# Patient Record
Sex: FEMALE | Race: OTHER | NOT HISPANIC OR LATINO | Employment: FULL TIME | URBAN - METROPOLITAN AREA
[De-identification: names, ages, dates, MRNs, and addresses within clinical notes are randomized per-mention and may not be internally consistent; named-entity substitution may affect disease eponyms.]

---

## 2022-01-11 RX ORDER — AMLODIPINE BESYLATE 5 MG/1
5 TABLET ORAL DAILY
COMMUNITY

## 2022-01-12 ENCOUNTER — OFFICE VISIT (OUTPATIENT)
Dept: FAMILY MEDICINE CLINIC | Facility: CLINIC | Age: 57
End: 2022-01-12

## 2022-01-12 VITALS
DIASTOLIC BLOOD PRESSURE: 70 MMHG | OXYGEN SATURATION: 99 % | SYSTOLIC BLOOD PRESSURE: 124 MMHG | HEART RATE: 77 BPM | HEIGHT: 67 IN | BODY MASS INDEX: 22.6 KG/M2 | WEIGHT: 144 LBS | RESPIRATION RATE: 16 BRPM

## 2022-01-12 DIAGNOSIS — Z12.4 CERVICAL CANCER SCREENING: ICD-10-CM

## 2022-01-12 DIAGNOSIS — Z12.31 ENCOUNTER FOR SCREENING MAMMOGRAM FOR MALIGNANT NEOPLASM OF BREAST: ICD-10-CM

## 2022-01-12 DIAGNOSIS — Z00.00 ANNUAL PHYSICAL EXAM: ICD-10-CM

## 2022-01-12 DIAGNOSIS — R53.1 WEAK: ICD-10-CM

## 2022-01-12 DIAGNOSIS — Z12.11 COLON CANCER SCREENING: ICD-10-CM

## 2022-01-12 DIAGNOSIS — Z76.89 ENCOUNTER TO ESTABLISH CARE: Primary | ICD-10-CM

## 2022-01-12 DIAGNOSIS — I10 ESSENTIAL HYPERTENSION: ICD-10-CM

## 2022-01-12 DIAGNOSIS — F43.21 GRIEF: ICD-10-CM

## 2022-01-12 DIAGNOSIS — Z83.49 FAMILY HISTORY OF THYROID DISEASE: ICD-10-CM

## 2022-01-12 DIAGNOSIS — K76.0 FATTY LIVER: ICD-10-CM

## 2022-01-12 DIAGNOSIS — Z13.220 LIPID SCREENING: ICD-10-CM

## 2022-01-12 PROBLEM — R19.8 GENERALIZED ABDOMINAL FULLNESS: Status: ACTIVE | Noted: 2021-12-09

## 2022-01-12 PROBLEM — R25.2 MUSCLE CRAMPS: Status: ACTIVE | Noted: 2021-12-09

## 2022-01-12 PROCEDURE — 99202 OFFICE O/P NEW SF 15 MIN: CPT | Performed by: FAMILY MEDICINE

## 2022-01-12 RX ORDER — MULTIVITAMIN
1 CAPSULE ORAL DAILY
COMMUNITY

## 2022-01-12 NOTE — PROGRESS NOTES
Assessment/Plan:   Diagnoses and all orders for this visit:    Encounter to establish care  Annual physical exam  - reviewed PMHx, PSHx, meds, allergies, FHx, Soc Hx and Sexual Hx   - UTD with COVID IMMs (Covid Shield - received in [facility]) - advised to schedule Booster   - declined Tdap/Flu vaccine in the office as w/o insurance   - discussed diet and exercise  - overdue for screening labs - script given   - declined STD screening in the office today   - overdue for annual GYN exam/cervical ca and breast ca screening - referral given for Ob/Gyn and script given for Mammo  - overdue for Colon Ca screening - referral given for GI  - overdue for Optho and Dental   - RTO in 1month with screening labs for f/u - pt and Daughter aware and agreeable     Essential hypertension  -     Comprehensive metabolic panel; Future  -     Microalbumin / creatinine urine ratio  - BP in the office 124/70 on my repeat  - of note, has not taken her CCB in >1month   - discussed diet and encouraged exercise  - limit Na to 2g/day   - caution with NSAIDs   - advised to get labs and RTO in 1month with home BP cuff for f/u - pt aware and agreeable     Weak  -     CBC and differential; Future  -     Iron Panel (Includes Ferritin, Iron Sat%, Iron, and TIBC); Future  -     Vitamin B12; Future  -     Vitamin D 25 hydroxy; Future    Grief  - of note, pt's  passed away suddenly 2/2 cerebral hemorrhage 6months prior in [facility] and pt moved to [address] to live with her daughter and her family   - unclear if she has been able to process her grief  - CARMELLA-7 score of 4  - PHQ-2 score of 0   - does express difficulty sleeping - for now advised no screens prior to bed, no food/caffeine 2hrs prior to desired bedtime and OTC Melatonin to be taken 1hr prior to desired bedtime      Cervical cancer screening  -     Ambulatory Referral to Gynecology;  Future    Encounter for screening mammogram for malignant neoplasm of breast  -     Mammo screening bilateral w 3d & cad; Future    Fatty liver  - per pt's daughter, pt with "swollen liver" - advised to bring reports at next OV   Colon cancer screening  -     Ambulatory Referral to Gastroenterology; Future    Lipid screening  -     Lipid panel; Future    Family history of thyroid disease  -     TSH, 3rd generation with Free T4 reflex    Other orders  -     amLODIPine (NORVASC) 5 mg tablet; Take 5 mg by mouth daily (Patient not taking: Reported on 1/12/2022 )  -     Multiple Vitamin (multivitamin) capsule; Take 1 capsule by mouth daily          Subjective:    Patient ID: Darian Arauz is a 65 y o  female    Darian Arauz is a 65 y o  female who presents to the office with her daughter to establish care and for an annual exam  - of note, pt does not speak [place], daughter acting as a    - of note, pt does not have insurance   - prior PCP: KEITH - last OV was 12/2021   - PMHx: HTN, "swollen" (?fatty) liver   - allergies: NKDA  - Meds: CCB 5mg PRN, MVI  - PSHx: none   - FHx: Daughter (Thyroid Dx), denies FHx of HTN/HL, denies Breast/Cervical/Colon Ca   - Immunizations: received COVID shield in [facility], declined Tdap/Flu vaccine in the office as w/o insurance   - GYN Hx: does not recall last PAP or Mammo   - Hm: overdue for Colon Ca screening   - diet/exercise: vegetarian/vegan diet, does not exercise   - social: denies tob/EtOH/illicits, lives with her daughter and her family   - sexual Hx:  passed away 6months ago   - last vision: wears glasses   - last dental: overdue   - BP in the office 140/74 and on my repeat 124/70   - (+) HA, generalized weakness, insomnia (of note, her  passed away 6months ago), "feels heavy" in the abdomen  - ROS: today in the office pt denies F/C/N/V/visual changes/CP/palpitations/SOB/wheezing/abd pain/D/C/LE edema       The following portions of the patient's history were reviewed and updated as appropriate: allergies, current medications, past family history, past medical history, past social history, past surgical history and problem list     Review of Systems  as per HPI    Objective:  /70 (BP Location: Right arm, Patient Position: Sitting, Cuff Size: Standard)   Pulse 77   Resp 16   Ht 5' 7" (1 702 m)   Wt 65 3 kg (144 lb)   SpO2 99%   BMI 22 55 kg/m²    Physical Exam  Vitals reviewed  Constitutional:       General: She is not in acute distress  Appearance: Normal appearance  She is normal weight  She is not ill-appearing, toxic-appearing or diaphoretic  HENT:      Head: Normocephalic and atraumatic  Right Ear: External ear normal       Left Ear: External ear normal    Eyes:      General: No scleral icterus  Right eye: No discharge  Left eye: No discharge  Extraocular Movements: Extraocular movements intact  Conjunctiva/sclera: Conjunctivae normal    Cardiovascular:      Rate and Rhythm: Normal rate and regular rhythm  Heart sounds: Normal heart sounds  No murmur heard  No friction rub  No gallop  Pulmonary:      Effort: Pulmonary effort is normal  No respiratory distress  Breath sounds: Normal breath sounds  No stridor  No wheezing, rhonchi or rales  Abdominal:      General: Bowel sounds are normal       Palpations: Abdomen is soft  Musculoskeletal:         General: Normal range of motion  Cervical back: Normal range of motion and neck supple  Right lower leg: No edema  Left lower leg: No edema  Skin:     General: Skin is warm  Neurological:      General: No focal deficit present  Mental Status: She is alert and oriented to person, place, and time  Psychiatric:         Attention and Perception: Attention normal          Mood and Affect: Mood and affect normal  Mood is not anxious or depressed  Speech: Speech normal  She is communicative  Behavior: Behavior normal  Behavior is cooperative  Thought Content:  Thought content normal  Thought content does not include homicidal or suicidal ideation  Thought content does not include homicidal or suicidal plan  Cognition and Memory: Cognition normal          Judgment: Judgment normal          Depression Screening Follow-up Plan: Patient's depression screening was positive with a PHQ-2 score of 0  Their PHQ-9 score was   Clinically patient does not have depression  No treatment is required

## 2022-01-17 ENCOUNTER — TELEPHONE (OUTPATIENT)
Dept: FAMILY MEDICINE CLINIC | Facility: CLINIC | Age: 57
End: 2022-01-17

## 2022-01-18 NOTE — TELEPHONE ENCOUNTER
I advised patient VIA [employer] that labs are being faxed and will inform of results once reviewed by the physician